# Patient Record
Sex: FEMALE | Race: WHITE | NOT HISPANIC OR LATINO | Employment: FULL TIME | URBAN - METROPOLITAN AREA
[De-identification: names, ages, dates, MRNs, and addresses within clinical notes are randomized per-mention and may not be internally consistent; named-entity substitution may affect disease eponyms.]

---

## 2023-08-31 ENCOUNTER — HOSPITAL ENCOUNTER (EMERGENCY)
Facility: CLINIC | Age: 43
Discharge: HOME | End: 2023-08-31
Attending: EMERGENCY MEDICINE | Admitting: EMERGENCY MEDICINE
Payer: COMMERCIAL

## 2023-08-31 VITALS
DIASTOLIC BLOOD PRESSURE: 91 MMHG | HEART RATE: 79 BPM | OXYGEN SATURATION: 98 % | SYSTOLIC BLOOD PRESSURE: 131 MMHG | RESPIRATION RATE: 18 BRPM | TEMPERATURE: 98.2 F

## 2023-08-31 DIAGNOSIS — T16.1XXA FOREIGN BODY OF RIGHT EAR, INITIAL ENCOUNTER: ICD-10-CM

## 2023-08-31 PROCEDURE — 99283 EMERGENCY DEPT VISIT LOW MDM: CPT | Mod: 25

## 2023-08-31 PROCEDURE — 69200 CLEAR OUTER EAR CANAL: CPT | Mod: RT

## 2023-08-31 ASSESSMENT — ACTIVITIES OF DAILY LIVING (ADL): ADLS_ACUITY_SCORE: 33

## 2023-08-31 NOTE — DISCHARGE INSTRUCTIONS
Apply ear drops as directed    Do not place anything in your ear.     Follow up with your doctor in 1-2 days    If you have persistent ear pain, follow up with ENT    Return to the ER for any worsening or concerning symptoms

## 2023-08-31 NOTE — ED PROVIDER NOTES
History     Chief Complaint:  Foreign Body in Ear       HPI   Tracy Trujillo is a 43 year old female who presents to the ED with a foreign body in her right ear. Patient believes that this object is the end of a rubber ear plug she wore overnight last night. She first noticed pain in her ear while using a Q-tip this morning. She has had worsening pain since this time. Tracy was seen in urgent care for this concern where they attempting to extract the object with forceps and flushed the ear without success. She was sent to the ED for further evaluation and possible use of suction.     Independent Historian:    None- Patient only    Review of External Notes:  Reviewed urgent care note patient seen prior to arrival sent here for further evaluation.    Medications:    Prozac  Loestrin   Calan     Past Medical History:    Cerebral cavernoma   Depression   Pelvic fracture   Infertility   HPV    Past Surgical History:     section  [place] teeth extraction      Physical Exam   Patient Vitals for the past 24 hrs:   BP Temp Temp src Pulse Resp SpO2   23 1708 (!) 131/91 -- -- -- -- --   23 1614 (!) 150/104 98.2  F (36.8  C) Temporal 79 18 98 %        Physical Exam  Vitals reviewed.   Constitutional:       General: She is not in acute distress.     Appearance: Normal appearance. She is not diaphoretic.   HENT:      Head: Normocephalic and atraumatic.      Left Ear: Tympanic membrane normal.      Ears:      Comments: Green-colored rubber foreign body identified in the right ear.  Successfully removed intact.  Tympanic membrane intact no perforation noted.  Scarring noted throughout the tympanic membrane that appears old.     Mouth/Throat:      Mouth: Mucous membranes are moist.   Eyes:      Extraocular Movements: Extraocular movements intact.      Pupils: Pupils are equal, round, and reactive to light.   Pulmonary:      Effort: No respiratory distress.   Musculoskeletal:         General: Normal range of  motion.   Skin:     General: Skin is warm and dry.      Capillary Refill: Capillary refill takes less than 2 seconds.   Neurological:      General: No focal deficit present.      Mental Status: She is alert.   Psychiatric:         Mood and Affect: Mood normal.         Behavior: Behavior normal.           Emergency Department Course     Procedures        Foreign Body Removal     Procedure: Foreign Body Removal    Consent: Verbal    Risks Discussed: pain, bleeding, damage to adjacent structures, incomplete removal, infection, and repeat attempts    Indication: Foreign body     Location: Ear    Preparation: None    Anesthesia/Sedation: None    Procedure Detail:   Technique instruments: Alligator forceps  Description: The foreign body was located and removed.     Patient Status: The patient tolerated the procedure well: Yes. There were no complications.     Emergency Department Course & Assessments:      Assessments:   I obtained history and examined the patient as noted above.  1700 Performed foreign body removal.     Social Determinants of Health affecting care:  None     Disposition:  The patient was discharged to home.     Impression & Plan      Medical Decision Makin-year-old female presenting today with foreign body in right ear.  Foreign body identified on exam was successfully removed intact.  Patient had relief of her pain.  Patient sent home on antibiotic ointment.  She is noted to have scarring throughout the TM from prior ear infections.  Patient hemodynamically stable, well-appearing.  Care instructions ENT follow-up provided.  Patient directed to follow-up as needed. All questions and concerns addressed at this time.       Diagnosis:    ICD-10-CM    1. Foreign body of right ear, initial encounter  T16.1XXA            Discharge Medications:  Discharge Medication List as of 2023  5:02 PM        START taking these medications    Details   ciprofloxacin-hydrocortisone (CIPRO HC OTIC) 0.2-1 %  otic suspension Place 3 drops in ear(s) 2 times daily for 7 days, Disp-3 mL, R-0, Local Print              Scribe Disclosure:  I, Lenka Rivas, am serving as a scribe at 5:01 PM on 8/31/2023 to document services personally performed by Vianney Ríos DO based on my observations and the provider's statements to me.  8/31/2023   Vianney Ríos DO Doan, Tiffani, DO  08/31/23 1712

## 2023-08-31 NOTE — ED TRIAGE NOTES
Pt reports that she believes that there is a piece of her ear plug in her right ear, pt went to  [facility] and they reported that they were unable to get this out. PT VSS and ABC's intact